# Patient Record
Sex: MALE | Race: WHITE | HISPANIC OR LATINO | Employment: FULL TIME | URBAN - METROPOLITAN AREA
[De-identification: names, ages, dates, MRNs, and addresses within clinical notes are randomized per-mention and may not be internally consistent; named-entity substitution may affect disease eponyms.]

---

## 2024-09-13 ENCOUNTER — HOSPITAL ENCOUNTER (EMERGENCY)
Facility: MEDICAL CENTER | Age: 49
End: 2024-09-13
Attending: EMERGENCY MEDICINE

## 2024-09-13 ENCOUNTER — OFFICE VISIT (OUTPATIENT)
Dept: URGENT CARE | Facility: CLINIC | Age: 49
End: 2024-09-13

## 2024-09-13 ENCOUNTER — APPOINTMENT (OUTPATIENT)
Dept: RADIOLOGY | Facility: IMAGING CENTER | Age: 49
End: 2024-09-13
Attending: NURSE PRACTITIONER

## 2024-09-13 VITALS
RESPIRATION RATE: 14 BRPM | DIASTOLIC BLOOD PRESSURE: 62 MMHG | OXYGEN SATURATION: 98 % | BODY MASS INDEX: 28.04 KG/M2 | HEIGHT: 68 IN | WEIGHT: 185 LBS | TEMPERATURE: 97.6 F | SYSTOLIC BLOOD PRESSURE: 122 MMHG | HEART RATE: 68 BPM

## 2024-09-13 VITALS
SYSTOLIC BLOOD PRESSURE: 116 MMHG | HEIGHT: 68 IN | RESPIRATION RATE: 14 BRPM | TEMPERATURE: 96.8 F | BODY MASS INDEX: 27.7 KG/M2 | WEIGHT: 182.76 LBS | OXYGEN SATURATION: 99 % | HEART RATE: 65 BPM | DIASTOLIC BLOOD PRESSURE: 72 MMHG

## 2024-09-13 DIAGNOSIS — S69.92XA FINGER INJURY, LEFT, INITIAL ENCOUNTER: ICD-10-CM

## 2024-09-13 DIAGNOSIS — S62.639B OPEN FRACTURE OF TUFT OF DISTAL PHALANX OF FINGER: ICD-10-CM

## 2024-09-13 DIAGNOSIS — S62.633B OPEN DISPLACED FRACTURE OF DISTAL PHALANX OF LEFT MIDDLE FINGER, INITIAL ENCOUNTER: ICD-10-CM

## 2024-09-13 DIAGNOSIS — S61.313A LACERATION OF LEFT MIDDLE FINGER WITHOUT FOREIGN BODY WITH DAMAGE TO NAIL, INITIAL ENCOUNTER: ICD-10-CM

## 2024-09-13 PROCEDURE — 99282 EMERGENCY DEPT VISIT SF MDM: CPT

## 2024-09-13 PROCEDURE — 700111 HCHG RX REV CODE 636 W/ 250 OVERRIDE (IP): Performed by: EMERGENCY MEDICINE

## 2024-09-13 PROCEDURE — 3078F DIAST BP <80 MM HG: CPT | Performed by: NURSE PRACTITIONER

## 2024-09-13 PROCEDURE — 303747 HCHG EXTRA SUTURE

## 2024-09-13 PROCEDURE — 90715 TDAP VACCINE 7 YRS/> IM: CPT | Performed by: NURSE PRACTITIONER

## 2024-09-13 PROCEDURE — 90471 IMMUNIZATION ADMIN: CPT | Performed by: NURSE PRACTITIONER

## 2024-09-13 PROCEDURE — 3074F SYST BP LT 130 MM HG: CPT | Performed by: NURSE PRACTITIONER

## 2024-09-13 PROCEDURE — 73140 X-RAY EXAM OF FINGER(S): CPT | Mod: TC,LT | Performed by: NURSE PRACTITIONER

## 2024-09-13 PROCEDURE — 304217 HCHG IRRIGATION SYSTEM

## 2024-09-13 PROCEDURE — 99204 OFFICE O/P NEW MOD 45 MIN: CPT | Mod: 25 | Performed by: NURSE PRACTITIONER

## 2024-09-13 PROCEDURE — 304999 HCHG REPAIR-SIMPLE/INTERMED LEVEL 1

## 2024-09-13 RX ORDER — CEPHALEXIN 500 MG/1
500 CAPSULE ORAL 4 TIMES DAILY
Qty: 20 CAPSULE | Refills: 0 | Status: ACTIVE | OUTPATIENT
Start: 2024-09-13 | End: 2024-09-18

## 2024-09-13 RX ORDER — BUPIVACAINE HYDROCHLORIDE 5 MG/ML
5 INJECTION, SOLUTION EPIDURAL; INTRACAUDAL ONCE
Status: COMPLETED | OUTPATIENT
Start: 2024-09-13 | End: 2024-09-13

## 2024-09-13 RX ADMIN — BUPIVACAINE HYDROCHLORIDE 5 ML: 5 INJECTION, SOLUTION EPIDURAL; INTRACAUDAL at 11:15

## 2024-09-13 ASSESSMENT — PAIN DESCRIPTION - PAIN TYPE: TYPE: ACUTE PAIN

## 2024-09-13 NOTE — PROGRESS NOTES
"Chief Complaint   Patient presents with    Hand Injury     Injury happened 1 hour ago,Left hand laceration, top of middle finger       HISTORY OF PRESENT ILLNESS: Patient is a pleasant 49 y.o. male who presents to urgent care today with concerns of finger laceration.  Patient states he accidentally sliced his left middle finger with a saw earlier today.  He came straight to urgent care for evaluation.  Cannot recall his last tetanus booster.    There are no problems to display for this patient.      Allergies:Patient has no known allergies.    No current [facility]-ordered outpatient medications on file.     No current [facility]-ordered facility-administered medications on file.       History reviewed. No pertinent past medical history.    Social History     Tobacco Use    Smoking status: Never    Smokeless tobacco: Never   Vaping Use    Vaping status: Never Used   Substance Use Topics    Alcohol use: Never    Drug use: Never       No family status information on file.   No family history on file.    ROS:  Review of Systems   Constitutional: Negative for fever, chills, weight loss, malaise, and fatigue.   HENT: Negative for ear pain, nosebleeds, congestion, sore throat and neck pain.    Eyes: Negative for vision changes.   Neuro: Negative for headache, sensory changes, weakness, seizure, LOC.   Cardiovascular: Negative for chest pain, palpitations, orthopnea and leg swelling.   Respiratory: Negative for cough, sputum production, shortness of breath and wheezing.   Gastrointestinal: Negative for abdominal pain, nausea, vomiting or diarrhea.   Genitourinary: Negative for dysuria, urgency and frequency.  Musculoskeletal: Negative for falls, neck pain, back pain, joint pain, myalgias.   Skin: Positive for laceration.  Negative for rash, diaphoresis.     Exam:  /62 (BP Location: Left arm, Patient Position: Sitting, BP Cuff Size: Adult)   Pulse 68   Temp 36.4 °C (97.6 °F) (Temporal)   Resp 14   Ht 1.727 m (5' 8\")   Wt " 83.9 kg (185 lb)   SpO2 98%   General: well-nourished, well-developed male in NAD  Head: normocephalic, atraumatic  Eyes: PERRLA, no conjunctival injection, acuity grossly intact, lids normal.  Ears: normal shape and symmetry, no tenderness, no discharge. External canals are without any significant edema or erythema. Tympanic membranes are without any inflammation, no effusion. Gross auditory acuity is intact.  Nose: symmetrical without tenderness, no discharge.  Mouth/Throat: reasonable hygiene, no erythema, exudates or tonsillar enlargement.  Neck: no masses, range of motion within normal limits, no tracheal deviation. No obvious thyroid enlargement.   Lymph: no cervical adenopathy. No supraclavicular adenopathy.   Neuro: alert and oriented. Cranial nerves 1-12 grossly intact. No sensory deficit.   Cardiovascular: regular rate and rhythm. No edema.  Pulmonary: no distress. Chest is symmetrical with respiration, no wheezes, crackles, or rhonchi.   Musculoskeletal: no clubbing, appropriate muscle tone, gait is stable.  Left middle finger: Full-thickness laceration noted to majority of pad and distal aspect of finger, bleeding controlled.  Skin: warm, no clubbing, no cyanosis, no rashes.   Psych: appropriate mood, affect, judgement.         Assessment/Plan:  1. Open displaced fracture of distal phalanx of left middle finger, initial encounter  DX-FINGER(S) 2+ LEFT    Tdap =>8yo IM            Patient presents with left finger laceration with concerns of open fracture (radiology read pending). At this time, I feel the patient requires a higher level of care in the ED for closer monitoring, possible Ortho consult, stat lab work and/or imaging for further evaluation. This has been discussed with the patient and he states agreement and understanding.  Tdap updated in clinic.  The patient is stable to leave POV at this time and will go directly to ED without delay.           Please note that this dictation was created  using voice recognition software. I have made every reasonable attempt to correct obvious errors, but I expect that there are errors of grammar and possibly content that I did not discover before finalizing the note.      ANETA Perez.

## 2024-09-13 NOTE — ED PROVIDER NOTES
"ER Provider Note    Scribed for Marcus Walters M.D. by Belinda Dahl. 9/13/2024   10:33 AM    Primary Care Provider: None pertinent.     CHIEF COMPLAINT  Chief Complaint   Patient presents with    Digit Pain    Hand Laceration     Cut L middle finger with saw at work.     EXTERNAL RECORDS REVIEWED  Care everywhere Patient was at Urgent Care today for finger laceration. He had an X-ray that showed mildy displaced tuft fracture at the distal phalanx.    HPI/ROS    OUTSIDE HISTORIAN(S):  Friend at bedside    Jluis Zamora is a 49 y.o. male who presents to the ED for evaluation of left finger laceration onset today. The patient states he also has left finger pain. He describes his finger pain as \"big\".  Patient is right handed. No alleviating or exacerbating factors were noted.    PAST MEDICAL HISTORY  History reviewed. No pertinent past medical history.    SURGICAL HISTORY  History reviewed. No pertinent surgical history.    FAMILY HISTORY  History reviewed. No pertinent family history.    SOCIAL HISTORY   reports that he has never smoked. He has never used smokeless tobacco. He reports that he does not drink alcohol and does not use drugs.    CURRENT MEDICATIONS  Discharge Medication List as of 9/13/2024  1:16 PM          ALLERGIES  No Known Allergies     PHYSICAL EXAM  BP (!) 140/86   Pulse 72   Temp 36 °C (96.8 °F) (Temporal)   Resp 16   Ht 1.727 m (5' 7.99\")   Wt 82.9 kg (182 lb 12.2 oz)   SpO2 95%   BMI 27.80 kg/m²    Constitutional: Moderate distress   Left Middle Distal Phalanx: 5 cm laceration over the pad of the finger  Skin: Warm, No rash.    Neurologic: Normal sensation        DIAGNOSTIC STUDIES    Procedures:    Digit Block:  Sterile process was used  Indication: Laceration  Consetnt: verbal from the patient  Location:  Left middle distal phalanx  Anesthesia: Bupivacaine 0.5% 5 cc's  Toleration: the patient tolerated well, no immediate complications or bleeding  Total procedure time: 7 " minutes    10:58 AM Patient underwent a digital block using Bupivacaine 0.5% . I utilized approximately 5 cc's of Bupivacaine 0.5% onto his right thumb to induce a digital block. There left middle distal phalanx was irrigated with normal saline and washed with soap and water. Patient tolerated the procedure well and had very good analgesic effect with this procedure.    Laceration Repair Procedure Note    Indication: Laceration    Procedure: The patient was placed in the appropriate position and anesthesia around the laceration was obtained with a full digital block of the left long (middle) finger using 0.5% Bupivacaine without epinephrine. The wound was minimally contaminated .The area was then irrigated with high pressure Shur-Clens and normal saline solution. The laceration was closed with 4-0 Ethilon using interrupted sutures. There were no additional lacerations requiring repair. The wound area was then dressed with a sterile dressing.      Total repaired wound length: 5 cm.     Other Items: Suture count: 8    The patient tolerated the procedure well.    Complications: 12:06 PM - Patient vagaled. Blood pressure was in the 80s.     COURSE & MEDICAL DECISION MAKING     INITIAL ASSESSMENT, COURSE AND PLAN      Care Narrative: Tip laceration with open tuft fracture, placed patient on antibiotics, suture the patient's laceration, will put the patient on antibiotics, have the patient follow-up with orthopedics.  Placed the patient on a splint, have the patient return in 10 days for suture removal    10:33 AM - Patient was evaluated at bedside. The patient will be treated with a laceration repair for his symptoms. Patient verbalizes understanding and support with my plan of care.     10:58 AM - Digit block was performed by myself at this time. See above for details. Verbal consent was obtained.    11:42 AM - I performed a laceration repair to the left middle phalanx at this time. See above for details. Verbal consent  was obtained.     12:06 PM - Patient vagaled. Blood pressure was in the 80s.     12:21 PM - I reevaluated the patient at bedside. The patient informs me they feel improved. I discussed plan for discharge and follow up as outlined below. The patient is stable for discharge at this time and will return for any new or worsening symptoms. Patient verbalizes understanding and support with my plan for discharge.       DISPOSITION AND DISCUSSIONS    I have discussed management of the patient with the following physicians and GUERLINE's:  None    Discussion of management with other QHP or appropriate source(s): None     Barriers to care at this time, including but not limited to: Patient does not have established PCP.     Decision tools and prescription drugs considered including, but not limited to: Antibiotics Keflex 500 mg  .    The patient will return for new or worsening symptoms and is stable at the time of discharge.    DISPOSITION:  Patient will be discharged home in stable condition.    FOLLOW UP:  [facility], Emergency Dept  [address] [place] [id number] [phone]    10 days for suture removal.    John Dupree M.D.  [address]  [address]  [facility] [address]  [phone]          Anjana Blanton  [address] [facility] [address]  [phone]          [facility], Emergency Dept  [address] [place] [id number] [phone]          OUTPATIENT MEDICATIONS:  Discharge Medication List as of 9/13/2024  1:16 PM        START taking these medications    Details   cephALEXin (KEFLEX) 500 MG Cap Take 1 Capsule by mouth 4 times a day for 5 days., Disp-20 Capsule, R-0, Normal           FINAL DIAGNOSIS  1. Laceration of left middle finger without foreign body with damage to nail, initial encounter    2. Open fracture of tuft of distal phalanx of finger         I, Beilnda Dahl ([place]), am scribing for, and in the presence of, Marcus Walters,  M.D..    Electronically signed by: Belinda Dahl (Scribe), 9/13/2024    IMarcus M.D. personally performed the services described in this documentation, as scribed by Belinda Dahl in my presence, and it is both accurate and complete.      The note accurately reflects work and decisions made by me.  Marcus Walters M.D.  9/13/2024  4:36 PM

## 2024-09-13 NOTE — ED NOTES
Pt discharged to home. Pt was given follow up instructions and prescriptions for abx. Pt verbalized understanding of all instructions for discharge and is ambulatory out of ED with steady gait. AOx4

## 2024-09-13 NOTE — ED TRIAGE NOTES
"Chief Complaint   Patient presents with    Digit Pain    Hand Laceration     Cut L middle finger with saw at work.     Pt seen at , sent to ED for open fracture.    Pt is alert and oriented, speaking in full sentences, follows commands and responds appropriately to questions. Resperations are even and unlabored.      Pt placed in lobby. Pt educated on triage process. Pt encouraged to alert staff for any changes.     Patient and staff wearing appropriate PPE.    BP (!) 140/86   Pulse 72   Temp 36 °C (96.8 °F) (Temporal)   Resp 16   Ht 1.727 m (5' 7.99\")   Wt 82.9 kg (182 lb 12.2 oz)   SpO2 95%      "

## 2024-09-23 ENCOUNTER — OFFICE VISIT (OUTPATIENT)
Dept: URGENT CARE | Facility: CLINIC | Age: 49
End: 2024-09-23

## 2024-09-23 VITALS
TEMPERATURE: 98 F | BODY MASS INDEX: 28.75 KG/M2 | OXYGEN SATURATION: 96 % | WEIGHT: 183.2 LBS | RESPIRATION RATE: 15 BRPM | SYSTOLIC BLOOD PRESSURE: 120 MMHG | HEART RATE: 71 BPM | HEIGHT: 67 IN | DIASTOLIC BLOOD PRESSURE: 70 MMHG

## 2024-09-23 DIAGNOSIS — Z48.02 VISIT FOR SUTURE REMOVAL: ICD-10-CM

## 2024-09-23 DIAGNOSIS — S62.633D OPEN DISPLACED FRACTURE OF DISTAL PHALANX OF LEFT MIDDLE FINGER WITH ROUTINE HEALING, SUBSEQUENT ENCOUNTER: ICD-10-CM

## 2024-09-23 PROCEDURE — 3078F DIAST BP <80 MM HG: CPT | Performed by: NURSE PRACTITIONER

## 2024-09-23 PROCEDURE — 3074F SYST BP LT 130 MM HG: CPT | Performed by: NURSE PRACTITIONER

## 2024-09-23 PROCEDURE — 99213 OFFICE O/P EST LOW 20 MIN: CPT | Performed by: NURSE PRACTITIONER

## 2024-09-23 NOTE — PROGRESS NOTES
Verbal consent was acquired by the patient to use [employer] ambient listening note generation during this visit     Date: 09/23/24        Chief Complaint   Patient presents with    Suture / Staple Removal          History of Present Illness  The patient is a 49-year-old male who presents to the clinic for suture removal. He is accompanied by an adult female.    On 09/13/2024, he sustained an injury from a saw, resulting in a laceration to his left middle phalanx, which was repaired at the distal end. A slightly displaced tuft fracture was also noted. The laceration repair was performed in the emergency department with 8 sutures placed. He was prescribed Keflex.    He reports persistent pain in his finger since the injury, which has been gradually improving. He recalls experiencing significant pain on the first day of the injury. He was not provided with any pain medication. Currently, he only experiences minor pain in his finger.    He experienced mild fever and body aches three days after the hospital visit, but these symptoms have since subsided.    He was given a finger splint, which he removes at night due to his work. He was informed that the tip of his finger was fractured but is unsure of the implications or necessary actions. He is curious about the healing process of his bone. Due to his work, he keeps the wound covered and wonders if it needs additional time to heal properly. He is also concerned about whether his finger will fully recover.         ROS:    No severe shortness of breath   No Cardiac like chest pain, as discussed   As otherwise stated in HPI    Medical/SX/ Social History:  Reviewed per chart    Pertinent Medications:    No current outpatient medications on file prior to visit.     No current facility-administered medications on file prior to visit.        Allergies:    Patient has no known allergies.     Problem list, medications, and allergies reviewed by myself today in [facility]     Physical  Exam:    Vitals:    09/23/24 1256   BP: 120/70   Pulse: 71   Resp: 15   Temp: 36.7 °C (98 °F)   SpO2: 96%             Physical Exam  Constitutional:       Appearance: Normal appearance.   Musculoskeletal:      Comments: 8 sutures and placed to the distal phalanx of the left middle finger wound edges are well-approximated scabbed.  8 sutures removed no dehiscence noted.  No signs of infection noted thin layer of Polysporin placed.  New finger splint placed     Range of motion intact.  Flexor and extensor tendon intact.  Cap refill less than 3 seconds   Neurological:      Mental Status: He is alert.              Medical Decision making and plan :  I personally reviewed prior external notes and test results pertinent to today's visit. Pt is clinically stable at today's acute urgent care visit.  Patient appears nontoxic with no acute distress noted. Appropriate for outpatient care at this time.    Pleasant 49 y.o. male presented clinic with:     Assessment & Plan  1. Left middle phalanx laceration.  The laceration on the left middle phalanx is expected to heal naturally. Due to the depth of the laceration, there may be permanent numbness and tingling at the fingertip. He is advised to continue using the finger splint for 6 to 8 weeks. The wound should be kept covered during work to prevent infection but can be left open at home when not in use. Daily cleaning with warm soapy water is recommended, avoiding hydrogen peroxide. A small amount of antibiotic ointment, such as Neosporin, can be applied sparingly to avoid excessive moisture. Once the scab has healed, covering is no longer necessary. He should change the dressing twice daily. Should there be increased redness, swelling, pain, or fevers, he should return for further evaluation. If pain persists beyond 6 to 8 weeks, another x-ray will be considered.    2. Tuft fracture of the left middle phalanx.  The tuft fracture is slightly displaced but is expected to heal on  its own. He should continue wearing the finger splint for 6 to 8 weeks. If there is increased pain, swelling, or other concerning symptoms, he should return for further evaluation. If pain persists beyond 6 to 8 weeks, another x-ray will be considered.    3. Medication Management.  He was placed on Keflex for the laceration repair done in the emergency department. He reports not receiving any pain medication.        Shared decision-making was utilized with patient for treatment plan. Medication discussed included indication for use and the potential benefits and side effects. Education was provided regarding the aforementioned assessments.  Differential Diagnosis, natural history, and supportive care discussed. All of the patient's questions were answered to their satisfaction at the time of discharge. Patient was encouraged to monitor symptoms closely. Those signs and symptoms which would warrant concern and mandate seeking a higher level of service through the emergency department discussed at length.  Patient stated agreement and understanding of this plan of care.    Disposition:  Home in stable condition     Voice Recognition Disclaimer:  Portions of this document were created using voice recognition software and [employer] technology provided by [employer]. The software does have a chance of producing errors of grammar and possibly content. I have made every reasonable attempt to correct obvious errors, but there may be errors of grammar and possibly content that I did not discover before finalizing the  documentation.    [email or web address].

## 2024-10-09 ENCOUNTER — TELEPHONE (OUTPATIENT)
Dept: URGENT CARE | Facility: CLINIC | Age: 49
End: 2024-10-09